# Patient Record
(demographics unavailable — no encounter records)

---

## 2019-01-31 NOTE — ED PDOC
Arrival/HPI





- General


Chief Complaint: Dizziness/Lightheaded


Time Seen by Provider: 19 13:00


Historian: Patient





- History of Present Illness


Narrative History of Present Illness (Text): 





19 13:43


36 year old female, with no significant past medical history, presents to the 

emergency department complaining of dizziness for the past 5 days. Patient 

states she intermittently feels like the room is spinning and her eye sight goes

black. She notes secondary loss of appetite and sometimes experiences 

palpitations. LNMP was approximately 20 days ago. Patient denies fevers, chills,

headache, chest pain, shortness of breath, dyspnea on exertion, cough, abdominal

pain, nausea, vomiting, diarrhea, back pain, neck pain, or any other complaint.











Time/Duration: < week


Symptom Onset: Gradual


Symptom Course: Unchanged


Activities at Onset: Light


Context: Home





Past Medical History





- Provider Review


Nursing Documentation Reviewed: Yes





- Past Medical History


Past Medical History: Non-Contributing





- Psychiatric


Hx Substance Use: No





- Past Surgical History


Past Surgical History: No Previous





- Surgical History


Hx  Section: Yes





- Anesthesia


Hx Anesthesia: Yes


Hx Anesthesia Reactions: No


Hx Malignant Hyperthermia: No





- Suicidal Assessment


Feels Threatened In Home Enviroment: No





Family/Social History





- Physician Review


Nursing Documentation Reviewed: Yes


Family/Social History: No Known Family HX


Smoking Status: Never Smoked


Hx Alcohol Use: Yes


Frequency of alcohol use: Socially


Hx Substance Use: No





Allergies/Home Meds


Allergies/Adverse Reactions: 


Allergies





No Known Allergies Allergy (Verified 01/18/15 01:45)


   











Review of Systems





- Physician Review


All systems were reviewed & negative as marked: Yes





- Review of Systems


Constitutional: absent: Fevers


Respiratory: absent: SOB, Cough


Cardiovascular: absent: Chest Pain


Gastrointestinal: Appetite Changes (loss of appetite).  absent: Abdominal Pain, 

Diarrhea, Nausea


Musculoskeletal: absent: Back Pain, Neck Pain


Skin: absent: Rash


Neurological: Dizziness.  absent: Headache





Physical Exam


Vital Signs Reviewed: Yes





Vital Signs











  Temp Pulse Resp BP Pulse Ox


 


 19 12:55  98.2 F  118 H  18  142/86  98











Temperature: Afebrile


Blood Pressure: Normal


Pulse: Tachycardic


Respiratory Rate: Normal


Appearance: Positive for: Well-Appearing, Non-Toxic, Comfortable


Pain Distress: None


Mental Status: Positive for: Alert and Oriented X 3





- Systems Exam


Head: Present: Atraumatic, Normocephalic


Pupils: Present: PERRL


Extroacular Muscles: Present: EOMI


Conjunctiva: Present: Normal


Mouth: Present: Moist Mucous Membranes


Neck: Present: Normal Range of Motion


Respiratory/Chest: Present: Clear to Auscultation, Good Air Exchange.  No: 

Respiratory Distress, Accessory Muscle Use


Cardiovascular: Present: Regular Rate and Rhythm, Normal S1, S2.  No: Murmurs


Abdomen: Present: Other (abdomen soft).  No: Tenderness, Distention, Peritoneal 

Signs


Back: Present: Normal Inspection


Upper Extremity: Present: Normal Inspection.  No: Cyanosis, Edema


Lower Extremity: Present: Normal Inspection.  No: Edema


Neurological: Present: GCS=15, CN II-XII Intact, Speech Normal, Motor Func 

Grossly Intact (motor strength 5/5 in upper and lower extremities ), Other 

(horizontal nystagmus noted)


Skin: Present: Warm, Dry, Normal Color.  No: Rashes


Psychiatric: Present: Alert, Oriented x 3, Normal Insight, Normal Concentration





Medical Decision Making


ED Course and Treatment: 





19 13:42


Impression:


36 year old female who presents to the emergency department complaining of 

dizziness. 





Differential Diagnosis included but are not limited to:  





Plan:


-- EKG


-- Labs


-- Antivert


-- Reglan


-- POC urine pregnancy test


-- Urinalysis 


-- Reassess and disposition





Prior Visits:


Notes and results from previous visits were reviewed. 





Progress Notes:








- Lab Interpretations


I have reviewed the lab results: Yes





- EKG Interpretation


EKG Interpretation (Text): 





19 13:52


EKG reviewed by me, shows: Sinus tachycardia at 101 bpm with no QT prolongation 

and no T wave inversions.








Interpreted by ED Physician: Yes


Type: 12 lead EKG





- Scribe Statement


The provider has reviewed the documentation as recorded by the Felicitas Do





Provider Scribe Attestation:


All medical record entries made by the Scribe were at my direction and personal

ly dictated by me. I have reviewed the chart and agree that the record 

accurately reflects my personal performance of the history, physical exam, 

medical decision making, and the department course for this patient. I have also

personally directed, reviewed, and agree with the discharge instructions and 

disposition.








Disposition/Present on Arrival





- Present on Arrival


Any Indicators Present on Arrival: No


History of DVT/PE: No


History of Uncontrolled Diabetes: No


Urinary Catheter: No


History of Decub. Ulcer: No


History Surgical Site Infection Following: None





- Disposition


Have Diagnosis and Disposition been Completed?: Yes


Diagnosis: 


 Vertigo





Disposition Time: 15:59


Patient Plan: Discharge


Patient Problems: 


                             Current Active Problems











Problem Status Onset


 


Vertigo Acute 











Condition: STABLE


Discharge Instructions (ExitCare):  Vertigo (a Type of Dizziness) (DC), 

Dizziness, Nonvertigo, (DC)


Print Language: Kyrgyz


Additional Instructions: 





All medical record entries made by the Scribe were at my direction and 

personally dictated by me. I have reviewed the chart and agree that the record 

accurately reflects my personal performance of the history, physical exam, 

medical decision making, and the department course for this patient. I have also

personally directed, reviewed, and agree with the discharge instructions and 

disposition.








Please monitor your symptoms for any changes in symptoms


Prescriptions: 


Meclizine [Antivert] 12.5 mg PO PRN PRN #10 tab


 PRN Reason: Dizziness


Metoclopramide HCl [Reglan] 10 mg PO Q6H #12 tablet


Forms:  NOSTROMO ICT (Hebrew), WORK NOTE

## 2019-01-31 NOTE — CARD
--------------- APPROVED REPORT --------------





Date of service: 01/31/2019



EKG Measurement

Heart [id number]

[id number]

[id number]

[id number]

[id number]



<Conclusion>

Sinus tachycardia

Otherwise normal ECG

## 2019-02-03 NOTE — CT
Date of service: 



02/03/2019



PROCEDURE:  CT Chest with contrast (Pulmonary Angiogram)



HISTORY:

Tachycardia 



COMPARISON:

None available.



TECHNIQUE:

Axial computed tomography images were obtained of the chest in the 

pulmonary arterial phase of enhancement. Coronal and sagittal 

reformatted images were created and reviewed.



Intravenous contrast dose: 146 cc Omnipaque 350



Radiation dose:



Total exam DLP = 309.92 mGy-cm.



This CT exam was performed using one or more of the following dose 

reduction techniques: Automated exposure control, adjustment of the 

mA and/or kV according to patient size, and/or use of iterative 

reconstruction technique.



FINDINGS:



PULMONARY ARTERIES:

There are filling defects seen within the distal of lower lobe 

pulmonary artery extending into at least 2 segmental branches and 

probably 1 or 2 subsegmental branches of the lower lobe of pulmonary 

arteries.  Questionable filling defect within a distal right upper 

lobe segmental pulmonary artery branch.  Pulmonary trunk measures 

approximately 2.6 cm; rule out underlying minimal pulmonary arterial 

hypertension.. 



AORTA:

No acute findings. No thoracic aortic aneurysm.  Ascending thoracic 

aorta measures approximately 2.5 cm.  The no aortic atherosclerotic 

calcification or mural plaque present.



LUNGS:

Unremarkable. No nodule, mass or pulmonary consolidation. 



PLEURAL SPACES:

Unremarkable. No effusion or pneumothorax. 



HEART:

Heart size is borderline enlarged.  No significant pericardial 

effusion. 



LYMPH NODES:

No significant mediastinal or hilar adenopathy.  



Small hiatal hernia 



Trachea midline and patent with no large central endoluminal lesions. 



BONES, CHEST WALL:

Unremarkable. No fracture or destructive lesion 



OTHER FINDINGS:

Unremarkable. 



IMPRESSION:

There is evidence of pulmonary emboli within the distal left lower 

lobe pulmonary artery,  proximal segmental and possibly additional 

subsegmental left lower lobe branch.



Questionable pulmonary embolus within a distal segmental branch right 

upper lobe



The the 



Note that these findings were discussed with the GRIFFIN Douglass at 

approximately 11:48 a.m. with written down and read back 

verification.

## 2019-02-03 NOTE — CT
Date of service: 



02/03/2019



PROCEDURE:  CT HEAD WITHOUT CONTRAST.



HISTORY:

dizziness



COMPARISON:

Wet the the the



TECHNIQUE:

Axial computed tomography images were obtained through the head/brain 

without intravenous contrast.  



Radiation dose:



Total exam DLP = 685.6 mGy-cm.



This CT exam was performed using one or more of the following dose 

reduction techniques: Automated exposure control, adjustment of the 

mA and/or kV according to patient size, and/or use of iterative 

reconstruction technique.



FINDINGS:



HEMORRHAGE:

No intracranial hemorrhage. 



BRAIN:

No mass effect or edema.  No atrophy or chronic microvascular 

ischemic changes.



VENTRICLES:

Unremarkable. No hydrocephalus. 



CALVARIUM:

Unremarkable.



PARANASAL SINUSES:

Unremarkable as visualized. No significant inflammatory changes.



MASTOID AIR CELLS:

Unremarkable as visualized. No inflammatory changes.



OTHER FINDINGS:

None.



IMPRESSION:

No acute intracranial hemorrhage.

## 2019-02-03 NOTE — US
HISTORY:

Leg pain and swelling. Evaluate for DVT



PHYSICIAN(S):  John Hess MD.



TECHNIQUE:

Duplex sonography and color-flow Doppler with graded compression were 

used to evaluate the deep venous systems of both lower extremities. 



FINDINGS:

The visualized deep venous systems of both lower extremities are 

sonographically normal and compressible. Normal wave forms and 

augmentation are seen. There is no sonographic evidence for deep 

venous thrombosis in the visualized segments of both lower 

extremities.



IMPRESSION:

No sonographic evidence for deep venous thrombosis in the visualized 

segments of both lower extremities.

## 2019-02-03 NOTE — CP.PCM.HP
<MemoFlorentin Romeo - Last Filed: 19 15:01>





History of Present Illness





- History of Present Illness


History of Present Illness: 





Florentin Hampton PGY1, History and Physical for Dr Shameka Cao





Pt is a 38yo female with no significant PMH presents to the emergency department

complaining of generalized weakness,  and hunger which started last Saturday. 

She states she has not eaten all day, last time she ate was last night. Pt 

states she presented to the [facility] ED on 19 for similiar symptoms. Pt states 

she has been taking OCP for the past 5 years. She denies a personal or family 

history of coagulation problems. Pt reports some mild associated SOB with the 

weakness. She denies chest pain, nausea, vomiting, headache. A 12 point ROS was 

obtained and added to the HPI where appropriate. 





PMH: denies


PSH:  x2


FH: mother no medical problems, father no medical problems 


SH: denies tobacco, occasional alcohol, denies illicit drugs


Home meds: OCP


Allergies: denies 


PMD: Dr Dwyer








Present on Admission





- Present on Admission


Any Indicators Present on Admission: No





Past Patient History





- Past Social History


Smoking Status: Never Smoked





- PSYCHIATRIC


Hx Substance Use: No





- SURGICAL HISTORY


Hx  Section: Yes





- ANESTHESIA


Hx Anesthesia: Yes


Hx Anesthesia Reactions: No


Hx Malignant Hyperthermia: No





Meds


Allergies/Adverse Reactions: 


                                    Allergies











Allergy/AdvReac Type Severity Reaction Status Date / Time


 


No Known Allergies Allergy   Verified 01/18/15 01:45














Physical Exam





- Head Exam


Head Exam: ATRAUMATIC, NORMOCEPHALIC





- Eye Exam


Eye Exam: EOMI





- ENT Exam


ENT Exam: Mucous Membranes Moist





- Neck Exam


Neck exam: Positive for: Full Rom





- Respiratory Exam


Respiratory Exam: Clear to Auscultation Bilateral, NORMAL BREATHING PATTERN.  

absent: Accessory Muscle Use, Respiratory Distress





- Cardiovascular Exam


Cardiovascular Exam: RRR, +S1, +S2.  absent: Diastolic murmur, Systolic Murmur





- GI/Abdominal Exam


GI & Abdominal Exam: Normal Bowel Sounds, Soft





- Extremities Exam


Extremities exam: Positive for: full ROM, normal inspection, pedal pulses 

present.  Negative for: calf tenderness, pedal edema, tenderness





- Neurological Exam


Neurological exam: Alert, Oriented x3





- Psychiatric Exam


Psychiatric exam: Normal Affect, Normal Mood





- Skin


Skin Exam: Dry, Intact, Warm





Results





- Vital Signs


Recent Vital Signs: 





                                Last Vital Signs











Temp  98.4 F   19 09:44


 


Pulse  93 H  19 11:59


 


Resp  18   19 11:59


 


BP  121/72   19 11:59


 


Pulse Ox  99   19 11:59














- Labs


Result Diagrams: 


                                 19 09:55





                                 19 09:55


Labs: 





                         Laboratory Results - last 24 hr











  19





  09:55 09:55 09:55


 


WBC   4.9  D 


 


RBC   4.38 


 


Hgb   12.8 


 


Hct   40.0 


 


MCV   91.3 


 


MCH   29.2 


 


MCHC   32.0 


 


RDW   13.6 


 


Plt Count   289 


 


MPV   10.0 


 


Neut % (Auto)   65.9 


 


Lymph % (Auto)   23.2 


 


Mono % (Auto)   9.3 H 


 


Eos % (Auto)   1.2 L 


 


Baso % (Auto)   0.4 


 


Lymph # (Auto)   1.1 L 


 


Mono # (Auto)   0.5 


 


Eos # (Auto)   0.1 


 


Baso # (Auto)   0.02 


 


Absolute Neuts (auto)   3.24 


 


PT   


 


INR   


 


APTT   


 


D-Dimer, Quantitative  1336 H  


 


Sodium    139


 


Potassium    4.1


 


Chloride    106


 


Carbon Dioxide    25


 


Anion Gap    13


 


BUN    8


 


Creatinine    0.6 L


 


Est GFR ( Amer)    > 60


 


Est GFR (Non-Af Amer)    > 60


 


Random Glucose    102


 


Calcium    9.4


 


Magnesium    2.1


 


Total Bilirubin    0.3


 


AST    26


 


ALT    24


 


Alkaline Phosphatase    59


 


Total Creatine Kinase    26 L


 


Troponin I    < 0.01


 


NT-Pro-B Natriuret Pep    33.7


 


Total Protein    8.0


 


Albumin    4.4


 


Globulin    3.5


 


Albumin/Globulin Ratio    1.3


 


Free T4   


 


TSH 3rd Generation   


 


Urine Color   


 


Urine Appearance   


 


Urine pH   


 


Ur Specific Gravity   


 


Urine Protein   


 


Urine Glucose (UA)   


 


Urine Ketones   


 


Urine Blood   


 


Urine Nitrate   


 


Urine Bilirubin   


 


Urine Urobilinogen   


 


Ur Leukocyte Esterase   














  19





  09:55 10:30 11:55


 


WBC   


 


RBC   


 


Hgb   


 


Hct   


 


MCV   


 


MCH   


 


MCHC   


 


RDW   


 


Plt Count   


 


MPV   


 


Neut % (Auto)   


 


Lymph % (Auto)   


 


Mono % (Auto)   


 


Eos % (Auto)   


 


Baso % (Auto)   


 


Lymph # (Auto)   


 


Mono # (Auto)   


 


Eos # (Auto)   


 


Baso # (Auto)   


 


Absolute Neuts (auto)   


 


PT    12.0


 


INR    1.06


 


APTT    31.2


 


D-Dimer, Quantitative   


 


Sodium   


 


Potassium   


 


Chloride   


 


Carbon Dioxide   


 


Anion Gap   


 


BUN   


 


Creatinine   


 


Est GFR ( Amer)   


 


Est GFR (Non-Af Amer)   


 


Random Glucose   


 


Calcium   


 


Magnesium   


 


Total Bilirubin   


 


AST   


 


ALT   


 


Alkaline Phosphatase   


 


Total Creatine Kinase   


 


Troponin I   


 


NT-Pro-B Natriuret Pep   


 


Total Protein   


 


Albumin   


 


Globulin   


 


Albumin/Globulin Ratio   


 


Free T4  0.97  


 


TSH 3rd Generation  1.24  


 


Urine Color   Yellow 


 


Urine Appearance   Clear 


 


Urine pH   6.5 


 


Ur Specific Gravity   <= 1.005 


 


Urine Protein   Negative 


 


Urine Glucose (UA)   Negative 


 


Urine Ketones   Negative 


 


Urine Blood   Negative 


 


Urine Nitrate   Negative 


 


Urine Bilirubin   Negative 


 


Urine Urobilinogen   0.2 


 


Ur Leukocyte Esterase   Negative 














Assessment & Plan





- Assessment and Plan (Free Text)


Assessment: 





Pt is a 38yo female with no significant PMH presents to the emergency department

complaining of generalized weakness,  and hunger which started last Saturday. 

She states she has not eaten all day, last time she ate was last night. Pt 

states she presented to the [facility] ED on 19 for similiar symptoms. 


Plan: 





Provoked Pulmonary Embolism


- therapeutic heparin drip


- coag studies, follow up


- CXR shows no active disease


- Head CT: no acute intracranial abnormality 


- Chest CT: evidence of pulmonary emboli within the distal left lower lobe 

pulmonary artery, proximal segment and possibly additional subsegmental left 

lower lobe branch.


- LE duplex up


- ECHO follow up


- pt counseled to stop taking OCP, as this may have increased her risk of having

a pulmonary embolism 


- pt advised to follow up with a hematologist as an out pt





Ppx, diet


- regular diet








Pt seen, examined, assessment and plan discussed with Dr Shameka aHmpton PGY1





- Date & Time


Date: 19


Time: 13:52





<Shameka Cao R - Last Filed: 19 16:02>





Results





- Vital Signs


Recent Vital Signs: 





                                Last Vital Signs











Temp  98.4 F   19 09:44


 


Pulse  90   19 15:02


 


Resp  18   19 15:02


 


BP  118/69   19 15:02


 


Pulse Ox  99   19 15:02














- Labs


Result Diagrams: 


                                 19 09:55





                                 19 09:55


Labs: 





                         Laboratory Results - last 24 hr











  02/03/19 02/03/19 02/03/19





  09:55 09:55 09:55


 


WBC   4.9  D 


 


RBC   4.38 


 


Hgb   12.8 


 


Hct   40.0 


 


MCV   91.3 


 


MCH   29.2 


 


MCHC   32.0 


 


RDW   13.6 


 


Plt Count   289 


 


MPV   10.0 


 


Neut % (Auto)   65.9 


 


Lymph % (Auto)   23.2 


 


Mono % (Auto)   9.3 H 


 


Eos % (Auto)   1.2 L 


 


Baso % (Auto)   0.4 


 


Lymph # (Auto)   1.1 L 


 


Mono # (Auto)   0.5 


 


Eos # (Auto)   0.1 


 


Baso # (Auto)   0.02 


 


Absolute Neuts (auto)   3.24 


 


PT   


 


INR   


 


APTT   


 


D-Dimer, Quantitative  1336 H  


 


Sodium    139


 


Potassium    4.1


 


Chloride    106


 


Carbon Dioxide    25


 


Anion Gap    13


 


BUN    8


 


Creatinine    0.6 L


 


Est GFR ( Amer)    > 60


 


Est GFR (Non-Af Amer)    > 60


 


Random Glucose    102


 


Calcium    9.4


 


Magnesium    2.1


 


Total Bilirubin    0.3


 


AST    26


 


ALT    24


 


Alkaline Phosphatase    59


 


Total Creatine Kinase    26 L


 


Troponin I    < 0.01


 


NT-Pro-B Natriuret Pep    33.7


 


Total Protein    8.0


 


Albumin    4.4


 


Globulin    3.5


 


Albumin/Globulin Ratio    1.3


 


Free T4   


 


TSH 3rd Generation   


 


Urine Color   


 


Urine Appearance   


 


Urine pH   


 


Ur Specific Gravity   


 


Urine Protein   


 


Urine Glucose (UA)   


 


Urine Ketones   


 


Urine Blood   


 


Urine Nitrate   


 


Urine Bilirubin   


 


Urine Urobilinogen   


 


Ur Leukocyte Esterase   














  19





  09:55 10:30 11:55


 


WBC   


 


RBC   


 


Hgb   


 


Hct   


 


MCV   


 


MCH   


 


MCHC   


 


RDW   


 


Plt Count   


 


MPV   


 


Neut % (Auto)   


 


Lymph % (Auto)   


 


Mono % (Auto)   


 


Eos % (Auto)   


 


Baso % (Auto)   


 


Lymph # (Auto)   


 


Mono # (Auto)   


 


Eos # (Auto)   


 


Baso # (Auto)   


 


Absolute Neuts (auto)   


 


PT    12.0


 


INR    1.06


 


APTT    31.2


 


D-Dimer, Quantitative   


 


Sodium   


 


Potassium   


 


Chloride   


 


Carbon Dioxide   


 


Anion Gap   


 


BUN   


 


Creatinine   


 


Est GFR ( Amer)   


 


Est GFR (Non-Af Amer)   


 


Random Glucose   


 


Calcium   


 


Magnesium   


 


Total Bilirubin   


 


AST   


 


ALT   


 


Alkaline Phosphatase   


 


Total Creatine Kinase   


 


Troponin I   


 


NT-Pro-B Natriuret Pep   


 


Total Protein   


 


Albumin   


 


Globulin   


 


Albumin/Globulin Ratio   


 


Free T4  0.97  


 


TSH 3rd Generation  1.24  


 


Urine Color   Yellow 


 


Urine Appearance   Clear 


 


Urine pH   6.5 


 


Ur Specific Gravity   <= 1.005 


 


Urine Protein   Negative 


 


Urine Glucose (UA)   Negative 


 


Urine Ketones   Negative 


 


Urine Blood   Negative 


 


Urine Nitrate   Negative 


 


Urine Bilirubin   Negative 


 


Urine Urobilinogen   0.2 


 


Ur Leukocyte Esterase   Negative 














Attending/Attestation





- Attestation


I have personally seen and examined this patient.: Yes


I have fully participated in the care of the patient.: Yes


I have reviewed all pertinent clinical information: Yes


Notes (Text): 


Patient seen and examined by me with resident at 1:15PM on 2/3/19. Case i

ncluding HPI, physical exam, and assessment and plan discussed with resident. 

Agree with above with following additions/corrections. 


CC: Weakness and dizziness. 


Patient is a 37-year-old female with no significant past medical history that 

presented to the emergency room complaining of generalized weakness and 

dizziness. Patient states that she has been experiencing these symptoms since 

2019. She presented to emergency room on 2019 and states that she 

left without a diagnosis. Patient states that she also has a little shortness of

breath with walking. Patient denies any recent travel. She states that she is a 

 and is pretty active. Patient states that she has been taking birth 

control pills for approximately 5 years now. She denies any chest pain or 

palpitations. No nausea, vomiting, or abdominal pain. No fevers or chills. No 

dysuria. No diarrhea or constipation. No neck or back pain. No headaches or 

lightheadedness. Patient denies any leg or calf pain.


12 point review of systems reviewed by me. Please see above HPI. All other 

systems negative.


Family history: Mother is alive and healthy. Father is alive and healthy.


Physical exam:


General: Awake and alert lying in bed in no acute distress


HEENT: Normocephalic, atraumatic. Extraocular muscles intact, pupils equal and 

reactive, no scleral icterus. Oropharynx is pink and moist. No pharyngeal 

erythema or exudate apreciated. Neck is supple. Hearing grossly intact. Ears and

nose externally unremarkable.


Cardiovascular: Regular rhythm. Normal S1 and S2. No murmurs, rubs, or gallops 

appreciated


Pulmonary: Normal respiratory effort.  No rhonchi, rales, or wheezing 

appreciated.


Gastrointestinal: Soft, nondistended. Nontender. Positive bowel sounds all 4 

quadrants. No guarding. 


Musculoskeletal:  Moves all extremities. No calf tenderness. No edema. 


Central nervous system: AAOx3,  CN 2-12 grossly intact. 5/5 muscle strength all 

extremities.


Dermatologic:  Skin warm and dry.


Assessment and plan: Patient is a 37-year-old female with no significant past 

medical history that presented to the emergency room complaining of generalized 

weakness and dizziness. 


1. Pulmonary emboli. Likely secondary to oral contraceptive pill use. Patient 

advised to stop using her OCPs. CTA chest per radiologist showed evidence of 

pulmonary emboli within the distal left lower lobe pulmonary artery, proximal 

segmental and possibly additional subsegmental left lower lobe branch, 

questionable pulmonary embolism within the distal segmental branch right upper 

lobe. Started on heparin drip. Risks including increased risk of bleeding 

discussed at length with patient. Patient understands the risks of blood 

thinners and agrees to take them. Patient understands that if she plans to be 

pregnant in the future, she will need to speak to her physician prior as she is 

on blood thinners. Bilateral lower extremity venous Dopplers ordered. 2-D echo 

ordered. Monitor on telemetry.


2. Dizziness and weakness. Likely secondary to pulmonary emboli. Head CT per 

radiologist showed no intracranial hemorrhage. Continue to monitor for now.


3. DVT prophylaxis. Heparin


Case discussed in detail with patient and patient's  at bedside with 

. All questions answered.

## 2019-02-03 NOTE — ED PDOC
Arrival/HPI





- General


Chief Complaint: Weakness/Neurological Deficit


Historian: Patient





- History of Present Illness


Narrative History of Present Illness (Text): 





19 09:41


37 year old female, no significant, pmh, nkda, complaining of  feeling fatigue

/dizziness with exertion of activity.  Pt. stated that she was seen here about 5

days ago, diagnosed with vertigo, discharge home with meclizine with limited 

relief, scheduled to see pmd Dr. Fior Dwyer tomorrow.  pt. stated that she 

is here because the symptoms persist and concern plus panic, arrived in the ER 

with tachycardia around 105s resting with negative orthostatic bp reading, no 

recent long distance traveling but she is on the birth control medication, no 

extremity swelling or ecchymosis, no numbness or tingling, no other medical or 

psychological complaints. 





Past Medical History





- Provider Review


Nursing Documentation Reviewed: Yes





- Reproductive


Currently Pregnant: No





- Past Medical History


Past Medical History: Non-Contributing





- Psychiatric


Hx Substance Use: No





- Past Surgical History


Past Surgical History: No Previous





- Surgical History


Hx  Section: Yes





- Anesthesia


Hx Anesthesia: Yes


Hx Anesthesia Reactions: No


Hx Malignant Hyperthermia: No





- Suicidal Assessment


Feels Threatened In Home Enviroment: No





Family/Social History





- Physician Review


Nursing Documentation Reviewed: Yes


Family/Social History: Unknown Family HX


Smoking Status: Never Smoked


Hx Alcohol Use: Yes


Hx Substance Use: No





Allergies/Home Meds


Allergies/Adverse Reactions: 


Allergies





No Known Allergies Allergy (Verified 01/18/15 01:45)


   








Home Medications: 


                                    Home Meds











 Medication  Instructions  Recorded  Confirmed


 


Birth Control Pill 1 tab PO DAILY 19 














Review of Systems





- Review of Systems


Constitutional: Fatigue.  absent: Fevers


Eyes: absent: Vision Changes


ENT: absent: Hearing Changes


Respiratory: absent: SOB, Cough


Cardiovascular: absent: Chest Pain


Gastrointestinal: absent: Abdominal Pain, Nausea, Vomiting


Skin: absent: Rash, Pruritis


Neurological: Dizziness.  absent: Headache, Focal Weakness, Gait Changes, Speech

Changes, Facial Droop, Disequilibrium, Seizure


Endocrine: absent: Diaphoresis


Hemo/Lymphatic: absent: Adenopathy


Psychiatric: absent: Depression, Suicidal Ideation





Physical Exam


Vital Signs Reviewed: Yes





Vital Signs











  Temp Pulse Resp BP Pulse Ox


 


 19 09:00  98.4 F  108 H  18  132/85  100











Temperature: Afebrile


Blood Pressure: Normal


Pulse: Tachycardic


Respiratory Rate: Normal


Appearance: Positive for: Well-Appearing, Non-Toxic, Comfortable


Pain Distress: None


Mental Status: Positive for: Alert and Oriented X 3





- Systems Exam


Head: Present: Atraumatic, Normocephalic.  No: Tenderness, Contusion, Swelling, 

Ecchymosis, Abrasion, Laceration, Other


Pupils: Present: PERRL


Extroacular Muscles: Present: EOMI


Conjunctiva: Present: Normal


Mouth: Present: Moist Mucous Membranes


Pharnyx: Present: Normal.  No: ERYTHEMA, EXUDATE, TONSILS ENLARGED, Uvular 

Deviation, Muffled/Hoarse Voice, Soft Palate/Uvular Edema


Nose (External): Present: Atraumatic.  No: Abrasion, Contusion, Laceration


Nose (Internal): Present: Normal Inspection, No Active Bleeding.  No: 

Rhinorrhea, Septal Hematoma, Epistaxis


Neck: Present: Normal Range of Motion


Respiratory/Chest: Present: Clear to Auscultation, Good Air Exchange.  No: 

Respiratory Distress, Accessory Muscle Use


Cardiovascular: Present: Regular Rate and Rhythm, Normal S1, S2.  No: Murmurs


Abdomen: No: Tenderness, Distention, Peritoneal Signs, Rebound, Guarding


Back: Present: Normal Inspection.  No: CVA Tenderness, Midline Tenderness, 

Paraspinal Tenderness


Upper Extremity: Present: Normal Inspection, Normal ROM, NORMAL PULSES, 

Neurovascularly Intact, Capillary Refill < 2s.  No: Cyanosis, Edema, Tenderness,

Swelling, Erythema, Deformity


Lower Extremity: Present: Normal Inspection, NORMAL PULSES, Normal ROM, 

Neurovascularly Intact, Capillary Refill < 2 s.  No: Edema, CALF TENDERNESS, 

Simón's Sign, Tenderness, Swelling, Deformity, Temperature Abnormalties


Neurological: Present: GCS=15, CN II-XII Intact, Speech Normal, Motor Func 

Grossly Intact, Normal Cerebellar Funct, Gait Normal, Memory Normal, Other 

(normal finger to nose test, normal heel to shin test, bilateral upper and lower

extremities motor 5/5. )


Skin: Present: Warm, Dry, Normal Color.  No: Rashes


Lymphatic: No: Cervical Adenopathy


Psychiatric: Present: Alert, Oriented x 3, Normal Insight, Normal Concentration





Medical Decision Making


ED Course and Treatment: 





19 09:44


-labs


-ekg


-cxr


-IVF


-cardiac monitor





19 11:05


-Dimer 1336, CT ordered





19 12:08


-Urine hcg is negative


-Orthostatic v/s: negative


-CT head show No acute intracranial hemorrhage.


-CTA Chest There is evidence of pulmonary emboli within the distal left lower 

lobe pulmonary artery,  proximal segmental and possibly additional subsegmental 

left lower lobe branch.  Questionable pulmonary embolus within a distal 

segmental branch right upper lobe


-Chest xray ER wet read: no active disease


-EKG: NSR @ 98 BPM, no ST elevation or depression, no T wave inversion


-Labs are non-significant


-BNP within normal limit


-Trop within normal limit. 


-Urinalysis show no UTI


-Pt. refused guaiac exam, stated that she has no rectal bleeding, no black color

stool, no abdominal pain.


-IV PT/PTT and heparin ordered.


-Paging hospitalist for admission. 





19 12:52


-I spoke to Dr. Hatch, discussed about the case/labs/radiology result, agreed to

admit to his service. 





- RAD Interpretation


Radiology Orders: 











19 09:39


CHEST PORTABLE [RAD] Stat 











CT head:





Date of service: 





2019





PROCEDURE:  CT HEAD WITHOUT CONTRAST.





HISTORY:


dizziness





COMPARISON:


Wet the the the





TECHNIQUE:


Axial computed tomography images were obtained through the head/brain without 

intravenous contrast.  





Radiation dose:





Total exam DLP = 685.6 mGy-cm.





This CT exam was performed using one or more of the following dose reduction 

techniques: Automated exposure control, adjustment of the mA and/or kV according

 to patient size, and/or use of iterative reconstruction technique.





FINDINGS:





HEMORRHAGE:


No intracranial hemorrhage. 





BRAIN:


No mass effect or edema.  No atrophy or chronic microvascular ischemic changes.





VENTRICLES:


Unremarkable. No hydrocephalus. 





CALVARIUM:


Unremarkable.





PARANASAL SINUSES:


Unremarkable as visualized. No significant inflammatory changes.





MASTOID AIR CELLS:


Unremarkable as visualized. No inflammatory changes.





OTHER FINDINGS:


None.





IMPRESSION:


No acute intracranial hemorrhage.





 

--------------------------------------------------------------------------------


-------------------


CTA chest: 





FINDINGS:





PULMONARY ARTERIES:


There are filling defects seen within the distal of lower lobe pulmonary artery 

extending into at least 2 segmental branches and probably 1 or 2 subsegmental 

branches of the lower lobe of pulmonary arteries.  Questionable filling defect 

within a distal right upper lobe segmental pulmonary artery branch.  Pulmonary 

trunk measures approximately 2.6 cm; rule out underlying minimal pulmonary 

arterial hypertension.. 





AORTA:


No acute findings. No thoracic aortic aneurysm.  Ascending thoracic aorta 

measures approximately 2.5 cm.  The no aortic atherosclerotic calcification or 

mural plaque present.





LUNGS:


Unremarkable. No nodule, mass or pulmonary consolidation. 





PLEURAL SPACES:


Unremarkable. No effusion or pneumothorax. 





HEART:


Heart size is borderline enlarged.  No significant pericardial effusion. 





LYMPH NODES:


No significant mediastinal or hilar adenopathy.  





Small hiatal hernia 





Trachea midline and patent with no large central endoluminal lesions. 





BONES, CHEST WALL:


Unremarkable. No fracture or destructive lesion 





OTHER FINDINGS:


Unremarkable. 





IMPRESSION:


There is evidence of pulmonary emboli within the distal left lower lobe 

pulmonary artery,  proximal segmental and possibly additional subsegmental left 

lower lobe branch.





Questionable pulmonary embolus within a distal segmental branch right upper lobe





The the 





Note that these findings were discussed with the GRIFFIN Douglass at approximately 11:48 

a.m. with written down and read back verification.


 

--------------------------------------------------------------------------------


----------------------


Chest xray: 





Date of service: 





2019





HISTORY:


 fatigue, dizziness 





COMPARISON:


No prior. 





FINDINGS:





LUNGS:


No active pulmonary disease.





PLEURA:


No significant pleural effusion identified, no pneumothorax apparent.





CARDIOVASCULAR:


No aortic atherosclerotic calcification present.





Normal cardiac size. No pulmonary vascular congestion. 





OSSEOUS STRUCTURES:


No significant abnormalities.





VISUALIZED UPPER ABDOMEN:


Normal.





OTHER FINDINGS:


None.





IMPRESSION:


No active disease.





: Radiologist





- EKG Interpretation


Interpreted by ED Physician: Yes


Type: 12 lead EKG





- Medication Orders


Current Medication Orders: 











Sodium Chloride (Sodium Chloride 0.9%)  1,000 mls @ 999 mls/hr IV .Q1H1M STA


   Stop: 19 10:39











- PA / NP / Resident Statement


MD/DO has reviewed & agrees with the documentation as recorded.





Disposition/Present on Arrival





- Present on Arrival


Any Indicators Present on Arrival: No


History of DVT/PE: No


History of Uncontrolled Diabetes: No


Urinary Catheter: No


History of Decub. Ulcer: No


History Surgical Site Infection Following: None





- Disposition


Have Diagnosis and Disposition been Completed?: Yes


Diagnosis: 


 Pulmonary embolism





Disposition: HOSPITALIZED


Disposition Time: 12:10


Patient Plan: Admission, Telemetry


Patient Problems: 


                             Current Active Problems











Problem Status Onset


 


Pulmonary embolism Acute 











Condition: GUARDED

## 2019-02-03 NOTE — CARD
--------------- APPROVED REPORT --------------





Date of service: 02/03/2019



EKG Measurement

Heart [id number]

[place] [id number]

[id number]

[id number]

[id number]



<Conclusion>

Normal sinus rhythm

Normal ECG

## 2019-02-03 NOTE — RAD
Date of service: 



02/03/2019



HISTORY:

 fatigue, dizziness 



COMPARISON:

No prior. 



FINDINGS:



LUNGS:

No active pulmonary disease.



PLEURA:

No significant pleural effusion identified, no pneumothorax apparent.



CARDIOVASCULAR:

No aortic atherosclerotic calcification present.



Normal cardiac size. No pulmonary vascular congestion. 



OSSEOUS STRUCTURES:

No significant abnormalities.



VISUALIZED UPPER ABDOMEN:

Normal.



OTHER FINDINGS:

None.



IMPRESSION:

No active disease.

## 2019-02-04 NOTE — CARD
--------------- APPROVED REPORT --------------





Date of service: 02/04/2019



EXAM: Two-dimensional and M-mode echocardiogram with Doppler and 

color Doppler.



INDICATION

Pulmonary Embolism 



2D DIMENSIONS 

Left Atrium (2D)3.2   (1.6-4.0cm)IVSd0.9   (0.7-1.1cm)

LVDd4.3   (3.9-5.9cm)PWd0.6   (0.7-1.1cm)

LVDs3.0   (2.5-4.0cm)FS (%) 31.8   %

LVEF (%)60.1   (>50%)



M-Mode DIMENSIONS 

Aortic Root2.40   (2.2-3.7cm)Aortic Cusp Exc.1.50   (1.5-2.0cm)



Aortic Valve

AoV Peak Wrauujtq794.0cm/Rahel Peak GR.7mmHg



Mitral Valve

MV E Mzlqcevl03.0cm/sMV A Yfgpwsbq78.7cm/sE/A ratio0.9



TDI

E/Lateral E'0.0E/Medial E'0.0



Tricuspid Valve

TR Peak Fcgmuokv219bj/sRAP UMCKSVUT74xhFbKG Peak Gr.21mmHg

[id number]



 LEFT VENTRICLE 

The left ventricle is normal size. There is normal left ventricular 

wall thickness. The left ventricular function is normal. The left 

ventricular ejection fraction is within the normal range. There is 

normal LV segmental wall motion. Transmitral Doppler flow pattern is 

Grade I-abnormal relaxation pattern.



 RIGHT VENTRICLE 

The right ventricle is normal size. There is normal right ventricular 

wall thickness. The right ventricular systolic function is normal.



 ATRIA 

The left atrium size is normal. The right atrium size is normal. 

There is a thrombus suspected in the right atrium.



 AORTIC VALVE 

The aortic valve is normal in structure. No aortic regurgitation is 

present. There is no aortic valvular stenosis. 



 MITRAL VALVE 

The mitral valve is normal in structure. There is no mitral valve 

regurgitation noted. There is no mitral valve stenosis.



 TRICUSPID VALVE 

There is mild tricuspid regurgitation.



 PULMONIC VALVE 

The pulmonary valve is normal in structure. There is no pulmonic 

valvular regurgitation. 



 GREAT VESSELS 

The aortic root is normal in size. The IVC is normal in size and 

collapses >50% with inspiration.



 PERICARDIAL EFFUSION 

There is no pericardial effusion.



<Conclusion>

There is normal left ventricular wall thickness.

The left ventricular function is normal.

The left ventricular ejection fraction is within the normal range.

There is normal LV segmental wall motion.

Transmitral Doppler flow pattern is Grade I-abnormal relaxation 

pattern.

The right ventricle is normal size.

The right ventricular systolic function is normal.

There is a thrombus suspected in the right atrium.

## 2019-02-04 NOTE — CP.PCM.PN
<MemoFlorentin - Last Filed: 02/04/19 17:29>





Subjective





- Date & Time of Evaluation


Date of Evaluation: 02/04/19


Time of Evaluation: 06:25





- Subjective


Subjective: 





Pt seen, examined this morning at bedside, pt denies chest pain, SOB. Per 

Nursing, PTT was 177.9, heparin was held for 1 hour, .7 heparin was held 

for 1 hour





Objective





- Vital Signs/Intake and Output


Vital Signs (last 24 hours): 


                                        











Temp Pulse Resp BP Pulse Ox


 


 97.9 F   76   18   102/69   99 


 


 02/04/19 08:00  02/04/19 10:00  02/04/19 08:00  02/04/19 08:00  02/04/19 08:00








Intake and Output: 


                                        











 02/04/19 02/04/19





 06:59 18:59


 


Intake Total  847


 


Balance  847














- Medications


Medications: 


                               Current Medications





Heparin Sodium/Sodium Chloride (Heparin 26176 Units/250ml 1/2 Normal Saline)  

25,000 units in 250 mls @ 11.202 mls/hr IV .[id number] PRN; Protocol


   PRN Reason: ADJUST RATE PER PROTOCOL


   Last Titration: 02/04/19 12:59 Dose:  12 units/kg/hr, 7.468 mls/hr











- Labs


Labs: 


                                        





                                 02/04/19 06:30 





                                 02/04/19 06:30 





                                        











PT  13.0 SECONDS (9.4-12.5)  H  02/03/19  19:21    


 


INR  1.17   02/03/19  19:21    


 


APTT  117.7 Seconds (26.9-38.3)  H*  02/04/19  10:40    














- Constitutional


Appears: No Acute Distress





- Head Exam


Head Exam: ATRAUMATIC, NORMOCEPHALIC





- Eye Exam


Eye Exam: EOMI





- ENT Exam


ENT Exam: Mucous Membranes Moist





- Neck Exam


Neck Exam: Full ROM





- Respiratory Exam


Respiratory Exam: Clear to Ausculation Bilateral, NORMAL BREATHING PATTERN.  

absent: Accessory Muscle Use





- Cardiovascular Exam


Cardiovascular Exam: Tachycardia, REGULAR RHYTHM, +S1, +S2.  absent: Diastolic 

murmur, Murmur





- GI/Abdominal Exam


GI & Abdominal Exam: Soft, Normal Bowel Sounds.  absent: Tenderness





- Extremities Exam


Extremities Exam: Full ROM.  absent: Calf Tenderness, Pedal Edema





- Neurological Exam


Neurological Exam: Alert, Awake, Oriented x3





- Psychiatric Exam


Psychiatric exam: Normal Affect, Normal Mood





- Skin


Skin Exam: Dry, Intact, Warm





Assessment and Plan





- Assessment and Plan (Free Text)


Assessment: 





Pt is a 38yo female with no significant PMH presents to the emergency department

complaining of generalized weakness,  and hunger which started last Saturday. 

She states she has not eaten all day, last time she ate was last night. Pt 

states she presented to the [facility] ED on 1/31/19 for similar symptoms. 


Plan: 





Provoked Pulmonary Embolism


- continue therapeutic heparin drip


- PT 13, INR 1.17, .7, D Dimer 1336


- will transition pt from heparin to eliquis 


- CXR shows no active disease


- Head CT: no acute intracranial abnormality 


- Chest CT: evidence of pulmonary emboli within the distal left lower lobe pu

lmonary artery, proximal segment and possibly additional subsegmental left lower

lobe branch.


- LE duplex no sonographic evidence of DVT


- ECHO completed, waiting for read


- pt counseled to stop taking OCP, as this may have increased her risk of having

a pulmonary embolism


- pt advised to follow up with a hematologist as an out pt


- Heme consulted





Ppx, diet


- regular diet








Pt seen, examined, assessment and plan discussed with Dr Reyna Hampton PGY1, Internal Medicine Resident 





<Katherine rOellana - Last Filed: 02/05/19 15:51>





Objective





- Vital Signs/Intake and Output


Vital Signs (last 24 hours): 


                                        











Temp Pulse Resp BP Pulse Ox


 


 97.1 F L  82   21   115/73   98 


 


 02/05/19 12:00  02/05/19 12:00  02/05/19 12:00  02/05/19 12:00  02/05/19 00:01








Intake and Output: 


                                        











 02/05/19 02/05/19





 06:59 18:59


 


Intake Total  480


 


Output Total  2


 


Balance  478














- Medications


Medications: 


                               Current Medications





Apixaban (Eliquis)  10 mg PO BID YUDITH; Protocol


   Last Admin: 02/05/19 09:32 Dose:  Not Given











- Labs


Labs: 


                                        





                                 02/05/19 07:00 





                                 02/05/19 07:00 





                                        











PT  13.0 SECONDS (9.4-12.5)  H  02/03/19  19:21    


 


INR  1.17   02/03/19  19:21    


 


APTT  78.6 Seconds (26.9-38.3)  H  02/05/19  07:00    














Attending/Attestation





- Attestation


I have personally seen and examined this patient.: Yes


I have fully participated in the care of the patient.: Yes


I have reviewed all pertinent clinical information, including history, physical 

exam and plan: Yes


Notes (Text): 





02/05/19 15:48





Attending note;





Patient seen and examined with resident.


Patient is alert and awake.


Complaining of mild dizziness.


Denies any chest pain.


Complaining of shortness of breath on exertion.


Tolerating diet.





Patient is a 37-year-old female with no significant past medical history that 

presented to the emergency room complaining of generalized weakness and 

dizziness. 





1. Pulmonary emboli. Likely secondary to oral contraceptive pill use. Patient 

advised to stop using her OCPs. 


CTA chest  showed evidence of pulmonary emboli within the distal left lower lobe

pulmonary artery, proximal segmental and possibly additional subsegmental left 

lower lobe branch, questionable pulmonary embolism within the distal segmental 

branch right upper lobe. 


currently on heparin drip. Risks including increased risk of bleeding discussed 

at length with patient. Patient understands the risks of blood thinners and 

agrees to take them. Patient understands that if she plans to be pregnant in the

future, she will need to speak to her physician prior as she is on blood 

thinners. Bilateral lower extremity venous Doppler is negative.


Echocardiogram pending.


2. Dizziness and weakness. Likely secondary to pulmonary emboli. Head CT  showed

no intracranial hemorrhage. Continue oxygen.


3. Needs outpatient hypercoagulable workup. Oncology evaluation requested.





Case discussed in detail with patient and patient's  at bedside with 

.


Upon discharge the patient will follow up with PMD Dr. Dwyer.

## 2019-02-05 NOTE — CP.PCM.DIS
<Florentin Hampton Romeo - Last Filed: 19 14:40>





Provider





- Provider


Date of Admission: 


19 12:54





Attending physician: 


Katherine Orellana MD





Consults: 








19 07:56


Physician Consult Routine 


   Comment: 


   Consulting Provider: Helen Villalba


   Consulting Physician: Helen Villalba


   Reason for Consult: provoked PE





19 07:08


Consult [Physician Consult] Routine 


   Comment: 


   Consulting Provider: Peter Rodriguez


   Consulting Physician: Peter Rodriguez


   Reason for Consult: r atrial thrombus











Time Spent in preparation of Discharge (in minutes): 40





Diagnosis





- Discharge Diagnosis


(1) Pulmonary embolism


Status: Acute   Priority: High   





Hospital Course





- Lab Results


Lab Results: 


                             Most Recent Lab Values











WBC  4.3 10^3/uL (4.5-11.0)  L D 19  07:00    


 


RBC  4.49 10^6/uL (3.5-6.1)   19  07:00    


 


Hgb  12.9 g/dL (12.0-16.0)   19  07:00    


 


Hct  40.7 % (36.0-48.0)   19  07:00    


 


MCV  90.6 fl (80.0-105.0)   19  07:00    


 


MCH  28.7 pg (25.0-35.0)   19  07:00    


 


MCHC  31.7 g/dl (31.0-37.0)   19  07:00    


 


RDW  13.8 % (11.5-14.5)   19  07:00    


 


Plt Count  298 10^3/uL (120.0-450.0)   19  07:00    


 


MPV  9.9 fl (7.0-11.0)   19  07:00    


 


Neut % (Auto)  40.9 % (50.0-68.0)  L  19  07:00    


 


Lymph % (Auto)  38.4 % (22.0-35.0)  H  19  07:00    


 


Mono % (Auto)  17.9 % (1.0-6.0)  H  19  07:00    


 


Eos % (Auto)  2.1 % (1.5-5.0)   19  07:00    


 


Baso % (Auto)  0.7 % (0.0-3.0)   19  07:00    


 


Lymph # (Auto)  1.7  (1.2-3.4)   19  07:00    


 


Mono # (Auto)  0.8  (0.1-0.6)  H  19  07:00    


 


Eos # (Auto)  0.1  (0.0-0.7)   19  07:00    


 


Baso # (Auto)  0.03 K/mm3 (0.0-2.0)   19  07:00    


 


Absolute Neuts (auto)  1.76  (1.4-6.5)   19  07:00    


 


PT  13.0 SECONDS (9.4-12.5)  H  19  19:21    


 


INR  1.17   19  19:21    


 


APTT  78.6 Seconds (26.9-38.3)  H  19  07:00    


 


D-Dimer, Quantitative  1336 ng/mlDDU (0-243)  H  19  09:55    


 


Sodium  140 mmol/L (132-148)   19  07:00    


 


Potassium  4.1 mmol/L (3.6-5.0)   19  07:00    


 


Chloride  105 mmol/L ()   19  07:00    


 


Carbon Dioxide  26 mmol/L (21-33)   19  07:00    


 


Anion Gap  14  (10-20)   19  07:00    


 


BUN  10 mg/dL (7-21)   19  07:00    


 


Creatinine  0.7 mg/dl (0.7-1.2)   19  07:00    


 


Est GFR ( Amer)  > 60   19  07:00    


 


Est GFR (Non-Af Amer)  > 60   19  07:00    


 


Random Glucose  100 mg/dL ()   19  07:00    


 


Calcium  9.5 mg/dL (8.4-10.5)   19  07:00    


 


Magnesium  2.1 mg/dL (1.7-2.2)   19  09:55    


 


Total Bilirubin  0.3 mg/dL (0.2-1.3)   19  07:00    


 


AST  34 U/L (14-36)   19  07:00    


 


ALT  22 U/L (7-56)   19  07:00    


 


Alkaline Phosphatase  60 U/L ()   19  07:00    


 


Lactate Dehydrogenase  334 U/L (333-699)   19  09:00    


 


Total Creatine Kinase  26 U/L ()  L  19  09:55    


 


Troponin I  < 0.01 ng/mL  19  09:55    


 


NT-Pro-B Natriuret Pep  33.7 pg/mL (0-450)   19  09:55    


 


Total Protein  7.8 g/dL (5.8-8.3)   19  07:00    


 


Albumin  4.3 g/dL (3.0-4.8)   19  07:00    


 


Globulin  3.5 gm/dL  19  07:00    


 


Albumin/Globulin Ratio  1.2  (1.1-1.8)   19  07:00    


 


Carcinoembryonic Ag  0.5 ng/mL (0.0-3.0)   19  09:00    


 


Free T4  0.97 ng/dL (0.78-2.19)   19  09:55    


 


TSH 3rd Generation  1.24 mIU/mL (0.46-4.68)   19  09:55    


 


Beta HCG, Quant  < 2.39 mIU/mL (0-6.15)   19  09:00    


 


Urine Color  Yellow  (YELLOW)   19  10:30    


 


Urine Appearance  Clear  (CLEAR)   19  10:30    


 


Urine pH  6.5  (4.7-8.0)   19  10:30    


 


Ur Specific Gravity  <= 1.005  (1.005-1.035)   19  10:30    


 


Urine Protein  Negative mg/dL (<30 mg/dL)   19  10:30    


 


Urine Glucose (UA)  Negative mg/dL (NEGATIVE)   19  10:30    


 


Urine Ketones  Negative mg/dL (NEGATIVE)   19  10:30    


 


Urine Blood  Negative  (NEGATIVE)   19  10:30    


 


Urine Nitrate  Negative  (NEGATIVE)   19  10:30    


 


Urine Bilirubin  Negative  (NEGATIVE)   19  10:30    


 


Urine Urobilinogen  0.2 E.U./dL (<1 E.U./dL)   19  10:30    


 


Ur Leukocyte Esterase  Negative Juice/uL (NEGATIVE)   19  10:30    














- Hospital Course


Hospital Course: 





Hospitalization


Pt is a 38 yo female with no significant PMH presents to the emergency 

department complaining of generalized weakness,  and hunger which started last 

Saturday. She denies a personal or family history of coagulation problems. Pt 

reports some mild associated SOB with the weakness. She states she has not eaten

all day, last time she ate was last night. Pt states she presented to the [facility] ED

on 19 for similiar symptoms. Pt states she has been taking OCP for the past

5 years. 





Discharge


Seen in the hospital for SOB and weakness. Found to have a PE and treated with 

therapeutic heparin. Follow up with your primary physician (Dr. Dwyer) within 1

week of discharge. Follow up with the blood specialist (Dr. Villalba/Dr. Nuñez) within 1-2 weeks of discharge. Started on a blood thinner, Eliquis.  Take

six more days of 10mg, 2 times per day, and then 5mg twice per day for at least 

3 months.  Given a script for a 1 month supply, please follow up with your 

primary doctor for further refills. Stop taking oral contraceptive pills.  DO 

NOT start taking them again while on the blood thinner.  DO NOT take any more 

contraceptives without first discussing and getting approval from your primary 

doctor. If you fall and hit your head while you are taking a blood thinner, you 

must go to your doctor or to the emergency department to get a scan of your head

to make sure you aren't bleeding in your head.





- Date & Time of H&P


Date of H&P: 19


Time of H&P: 06:00





Discharge Exam





- Head Exam


Head Exam: ATRAUMATIC, NORMOCEPHALIC





- Eye Exam


Eye Exam: EOMI





- ENT Exam


ENT Exam: Mucous Membranes Moist





- Neck Exam


Neck exam: Full Rom





- Respiratory Exam


Respiratory Exam: NORMAL BREATHING PATTERN, UNREMARKABLE.  absent: Accessory 

Muscle Use, Respiratory Distress





- Cardiovascular Exam


Cardiovascular Exam: RRR, +S1, +S2.  absent: Diastolic murmur, Systolic Murmur





- GI/Abdominal Exam


GI & Abdominal Exam: Normal Bowel Sounds, Unremarkable





- Extremities Exam


Extremities exam: full ROM, pedal pulses present





- Neurological Exam


Neurological exam: Alert, Oriented x3





- Psychiatric Exam


Psychiatric exam: Normal Affect, Normal Mood





- Skin


Skin Exam: Dry, Warm





Discharge Plan





- Discharge Medications


Prescriptions: 


Apixaban [Eliquis] 5 mg PO BID 30 Days #60 tab


Apixaban [Eliquis] 10 mg PO BID 6 Days #12 tablet





- Follow Up Plan


Condition: GUARDED


Disposition: HOME/ ROUTINE


Instructions:  Pulmonary Embolism (Blood Clot in the Lungs) (DC), Going Home on 

Blood Thinners 


Additional Instructions: 


You were seen in the hospital for your shortness of breath and weakness.  You 

were found to have a blood clot in your lungs.


Please follow up with your primary physician (Dr. Dwyer) within 1 week of 

discharge.  Please bring all papers given to you from the hospital when you see 

your doctor.


Please follow up with the blood specialist (Dr. Villalba/Dr. Nuñez) within 1-

2 weeks of discharge.


You have been started on a blood thinner, Eliquis.  You need to take six more 

days of 10mg, 2 times per day, and then 5mg twice per day for at least 3 months.

  You have been given a script for a 1 month supply, please follow up with your 

primary doctor for further refills.


Please stop taking oral contraceptive pills.  DO NOT start taking them again 

while on the blood thinner.  DO NOT take any more contraceptives without first 

discussing and getting approval from your primary doctor.


If you fall and hit your head while you are taking a blood thinner, you must go 

to your doctor or to the emergency department to get a scan of your head to make

 sure you aren't bleeding in your head.


If you experience new concerning or worsening symptoms, please report to the [facility] Emergency Department.











Usted fue atendido en el hospital por lacy falta de aliento y debilidad. Se 

encontr que dominick un cogulo de medina en los pulmones.


Por favor, osmin un seguimiento con lacy mdico primario (Dr. Dwyer) dentro de la 

primera semana del estuardo. Cuando pradeep a lacy mdico, traiga todos los documentos que

 le entreg el hospital.


Por favor osmin un seguimiento con el especialista en medina (Dr. Villalba / 

Dr. Nuñez) dentro de 1-2 semanas despus del estuardo.


Has comenzado con un anticoagulante, Eliquis. Debe vikram seis arreola ms de 10 mg,

 2 veces al da y luego 5 mg dos veces al da mic al menos 3 meses. Se le ha

 entregado un juan pablo para un suministro de 1 mes, consulte a lacy mdico de 

cabecera para obtener resurtidos adicionales.


Por favor, deje de vikram pldoras anticonceptivas orales. NO comience a tomarlos

 nuevamente mientras est en el anticoagulante. NO tome ms anticonceptivos sin 

antes discutir y obtener la aprobacin de lacy mdico de charly.


Si se  y se golpea la martha mientras nakita un anticoagulante, debe acudir a 

lacy mdico o al servicio de urgencias para que le realicen un examen de la martha

 para asegurarse de que no est sangrando.


Si experimenta sntomas nuevos o que empeoran, informe al Departamento de 

Emergencias ms shilo.


Referrals: 


Helen Villalba MD [Staff Provider] - 


Fior Dwyer DO [Family Provider] - 





<Katherine Orellana - Last Filed: 19 15:54>





Provider





- Provider


Date of Admission: 


19 12:54





Attending physician: 


Katherine Orellana MD





Consults: 








19 07:56


Physician Consult Routine 


   Comment: 


   Consulting Provider: Helen Villalba


   Consulting Physician: Helen Villalba


   Reason for Consult: provoked PE





19 07:08


Consult [Physician Consult] Routine 


   Comment: 


   Consulting Provider: Peter Rodriguez


   Consulting Physician: Peter Rodriguez


   Reason for Consult: r atrial thrombus














Hospital Course





- Lab Results


Lab Results: 


                             Most Recent Lab Values











WBC  4.3 10^3/uL (4.5-11.0)  L D 19  07:00    


 


RBC  4.49 10^6/uL (3.5-6.1)   19  07:00    


 


Hgb  12.9 g/dL (12.0-16.0)   19  07:00    


 


Hct  40.7 % (36.0-48.0)   19  07:00    


 


MCV  90.6 fl (80.0-105.0)   19  07:00    


 


MCH  28.7 pg (25.0-35.0)   19  07:00    


 


MCHC  31.7 g/dl (31.0-37.0)   19  07:00    


 


RDW  13.8 % (11.5-14.5)   19  07:00    


 


Plt Count  298 10^3/uL (120.0-450.0)   19  07:00    


 


MPV  9.9 fl (7.0-11.0)   19  07:00    


 


Neut % (Auto)  40.9 % (50.0-68.0)  L  19  07:00    


 


Lymph % (Auto)  38.4 % (22.0-35.0)  H  19  07:00    


 


Mono % (Auto)  17.9 % (1.0-6.0)  H  19  07:00    


 


Eos % (Auto)  2.1 % (1.5-5.0)   19  07:00    


 


Baso % (Auto)  0.7 % (0.0-3.0)   19  07:00    


 


Lymph # (Auto)  1.7  (1.2-3.4)   19  07:00    


 


Mono # (Auto)  0.8  (0.1-0.6)  H  19  07:00    


 


Eos # (Auto)  0.1  (0.0-0.7)   19  07:00    


 


Baso # (Auto)  0.03 K/mm3 (0.0-2.0)   19  07:00    


 


Absolute Neuts (auto)  1.76  (1.4-6.5)   19  07:00    


 


PT  13.0 SECONDS (9.4-12.5)  H  19  19:21    


 


INR  1.17   19  19:21    


 


APTT  78.6 Seconds (26.9-38.3)  H  19  07:00    


 


D-Dimer, Quantitative  1336 ng/mlDDU (0-243)  H  19  09:55    


 


Sodium  140 mmol/L (132-148)   19  07:00    


 


Potassium  4.1 mmol/L (3.6-5.0)   19  07:00    


 


Chloride  105 mmol/L ()   19  07:00    


 


Carbon Dioxide  26 mmol/L (21-33)   19  07:00    


 


Anion Gap  14  (10-20)   19  07:00    


 


BUN  10 mg/dL (7-21)   19  07:00    


 


Creatinine  0.7 mg/dl (0.7-1.2)   19  07:00    


 


Est GFR ( Amer)  > 60   19  07:00    


 


Est GFR (Non-Af Amer)  > 60   19  07:00    


 


Random Glucose  100 mg/dL ()   19  07:00    


 


Calcium  9.5 mg/dL (8.4-10.5)   19  07:00    


 


Magnesium  2.1 mg/dL (1.7-2.2)   19  09:55    


 


Total Bilirubin  0.3 mg/dL (0.2-1.3)   19  07:00    


 


AST  34 U/L (14-36)   19  07:00    


 


ALT  22 U/L (7-56)   19  07:00    


 


Alkaline Phosphatase  60 U/L ()   19  07:00    


 


Lactate Dehydrogenase  334 U/L (333-699)   19  09:00    


 


Total Creatine Kinase  26 U/L ()  L  19  09:55    


 


Troponin I  < 0.01 ng/mL  19  09:55    


 


NT-Pro-B Natriuret Pep  33.7 pg/mL (0-450)   19  09:55    


 


Total Protein  7.8 g/dL (5.8-8.3)   19  07:00    


 


Albumin  4.3 g/dL (3.0-4.8)   19  07:00    


 


Globulin  3.5 gm/dL  19  07:00    


 


Albumin/Globulin Ratio  1.2  (1.1-1.8)   19  07:00    


 


Carcinoembryonic Ag  0.5 ng/mL (0.0-3.0)   19  09:00    


 


Free T4  0.97 ng/dL (0.78-2.19)   19  09:55    


 


TSH 3rd Generation  1.24 mIU/mL (0.46-4.68)   19  09:55    


 


Beta HCG, Quant  < 2.39 mIU/mL (0-6.15)   19  09:00    


 


Urine Color  Yellow  (YELLOW)   19  10:30    


 


Urine Appearance  Clear  (CLEAR)   19  10:30    


 


Urine pH  6.5  (4.7-8.0)   19  10:30    


 


Ur Specific Gravity  <= 1.005  (1.005-1.035)   19  10:30    


 


Urine Protein  Negative mg/dL (<30 mg/dL)   19  10:30    


 


Urine Glucose (UA)  Negative mg/dL (NEGATIVE)   19  10:30    


 


Urine Ketones  Negative mg/dL (NEGATIVE)   19  10:30    


 


Urine Blood  Negative  (NEGATIVE)   19  10:30    


 


Urine Nitrate  Negative  (NEGATIVE)   19  10:30    


 


Urine Bilirubin  Negative  (NEGATIVE)   19  10:30    


 


Urine Urobilinogen  0.2 E.U./dL (<1 E.U./dL)   19  10:30    


 


Ur Leukocyte Esterase  Negative Juice/uL (NEGATIVE)   19  10:30    














Attending/Attestation





- Attestation


I have personally seen and examined this patient.: Yes


I have fully participated in the care of the patient.: Yes


I have reviewed all pertinent clinical information, including history, physical 

exam and plan: Yes


Notes (Text): 





19 15:51








Attending note;





Patient seen and examined with resident.


Patient is alert and awake.


 denies any dizziness.


 Patient ambulated with physical therapy without hypoxia.


Denies any chest pain.


Complaining of shortness of breath on exertion.


Tolerating diet.





Patient is a 37-year-old female with no significant past medical history that 

presented to the emergency room complaining of generalized weakness and 

dizziness. 





1. Pulmonary emboli. Likely secondary to oral contraceptive pill use. Patient 

advised to stop using her OCPs. 


CTA chest  showed evidence of pulmonary emboli within the distal left lower lobe

pulmonary artery, proximal segmental and possibly additional subsegmental left 

lower lobe branch, questionable pulmonary embolism within the distal segmental 

branch right upper lobe. 


Treated with IV heparin drip.


Currently started on eliquis. Medication delivered to the bedside.


 Risks including increased risk of bleeding discussed at length with patient. 

Patient understands the risks of blood thinners and agrees to take them. Patient

understands that if she plans to be pregnant in the future, she will need to 

speak to her physician prior as she is on blood thinners. Bilateral lower 

extremity venous Doppler is negative.


Echocardiogram showed ejection fraction of 60% on suspected thrombus in the 

right atrium.


Echocardiogram reviewed with cardiologist  Dr. James in detail. Advised to 

continue anti-coagulation.


2. Dizziness and weakness.  resolved. Head CT  showed no intracranial 

hemorrhage. Continue oxygen.


3. Hematology evaluation appreciated. Hypercoagulable workup sent.








Case discussed with PMD Dr. Dwyer in detail.


The follow-up plan explained in detail. Advised to get blood work results from 

medical records.


Patient will be referred to hematologist by PMD.


Patient needs to continue eliquis.








Case discussed in detail with patient and patient's  at bedside with 

.


Upon discharge the patient will follow up with PMROSLYN Dwyer.








19 15:52

## 2019-02-05 NOTE — CON
DATE:  02/05/2019



HEMATOLOGY CONSULTATION



HISTORY OF PRESENT ILLNESS:  This is a 37-year-old woman who comes in with

a pulmonary embolism.  The patient was put on birth control pills recently.

She has been feeling weak, tired.  She went to the emergency room, was sent

home, but now she comes back in with increasing weakness and they

discovered pulmonary emboli and her CAT scan _____ of legs were negative.



PHYSICAL EXAMINATION:

SKIN:  No petechiae.  No bruises.

HEENT:  Anicteric.  Nodes nonpalpable in the axillary, cervical, and

supraclavicular, or inguinal regions.

LUNGS:  Clear at present.  No vertebral tenderness.

BREAST:  Shows no mass, discharge or dimpling.

ABDOMEN:  Shows no liver, no spleen, no tenderness.

EXTREMITIES:  Shows no edema.  Simón's sign is negative.  Plantars are

downgoing bilaterally.



MEDICATIONS:  She is on heparin.  She is going to be switched to Eliquis.



ASSESSMENT AND PLAN:  I told her that she has to take the Eliquis for nine

months total.  For pulmonary embolism, her general doctor is Dr. Uriarte

and I told her that I am ordering many different blood tests to see if she

has a genetic cause for the blood clots, but in the meantime, she has to be

off the oral contraceptives, try something different as well as be on the

Eliquis.  She will be following up with Dr. Uriarte for this.







__________________________________________

Dann Nuñez MD





DD:  02/05/2019 7:53:48

DT:  02/05/2019 9:32:06

Job # [id number]

## 2019-02-06 NOTE — CON
DATE:  2019



REQUESTING PHYSICIAN:  Katherine Orellana MD



REASON FOR CONSULTATION:  Possible right atrial thrombus.



HISTORY OF PRESENT ILLNESS:  This is a 37-year-old woman who presented to

the emergency room complaining of generalized weakness and poor appetite. 

She had recent lightheadedness.  She was seen in the emergency room several

days ago and given Antivert.  She presented for recurrent symptoms.  Blood

work revealed a D-dimer of 1336.  CT angiogram was performed which showed

evidence of distal left lower lobe pulmonary emboli as well as possible

emboli in the left lower lobe branch as well.  An echocardiogram was

performed.  This revealed no evidence of right heart strain.  Left circular

function was normal.  Thrombus could not be excluded in the right atrium. 

Further evaluation was requested.  She has been on oral contraceptives for

the past 5 years.



PAST MEDICAL HISTORY:  Notable for two  sections.



MEDICATIONS AT HOME:  Oral contraceptives only.



ALLERGIES:  NONE.



SOCIAL HISTORY:  She does not smoke or drink.



FAMILY HISTORY:  Both parents are alive and in good health.  There is no

history of premature heart disease or sudden death.



REVIEW OF SYSTEMS:  A 10-point review of system is otherwise unremarkable. 

There is no history of prior thromboembolic events.



PHYSICAL EXAMINATION:

GENERAL:  She is a fairly healthy appearing middle aged woman.

VITAL SIGNS:  Her blood pressure is 112/70 with a pulse of 86 and sinus,

respirations of 16.  She is afebrile.

HEENT:  Normocephalic and atraumatic.

NECK:  Supple.  No JVD noted.

CHEST:  Clear to auscultation and percussion.

HEART:  PMI in normal position.  No pathological murmurs or gallops noted.

ABDOMEN:  Soft and nontender.  Normoactive bowel sounds.

EXTREMITIES:  No clubbing, cyanosis, edema.

SKIN:  Warm and dry.

PSYCHIATRIC:  Normal mood and affect.

NEUROLOGIC:  Alert and oriented x3 with no gross motor or sensory deficits

noted.



DIAGNOSTIC DATA:  Potassium is 4.1, BUN and creatinine 10 and 0.7.  White

count is 4.3, hemoglobin and hematocrit 12.9 and 40.7 with platelet count

298,000.  PTT is 78.6 on IV heparin.  Chest x-ray reveals normal cardiac

silhouette with clear lung fields.  Electrocardiogram shows sinus rhythm

with no abnormalities noted.  Lower extremity venous Duplex shows no

evidence of DVT.  Echocardiogram was reviewed and there does not appear to

be a clear thrombus in the right atrium.  The visualized segment appears to

be mild thickening of the tricuspid and tricuspid valve annulus.



IMPRESSION:  Apparent pulmonary emboli possibly related to oral

contraceptive use and hypercoagulable workup is in progress.



RECOMMENDATIONS.  I agree with current management at this time.  Avoidance

of oral contraceptive use is advised.  Oral Eliquis therapy has been

initiated will be continued for 6 months given her pulmonary embolus.  We

will be happy to see her in the future as needed.







__________________________________________

Peter Rodriguez MD





DD:  2019 21:13:49

DT:  2019 1:54:13

[facility] # [id number]

MTDD

## 2019-02-19 NOTE — ED PDOC
Arrival/HPI





- General


Chief Complaint: Lower Extremity Problem/Injury


Time Seen by Provider: 19 23:34


Historian: Patient





- History of Present Illness


Narrative History of Present Illness (Text): 





19 01:04


38 yo F presents to the emergency room complaining of her knees feeling weak, 

shaky and anxious tonight while she was trying to sleep.  Patient states that 

she was recently here in this hospital earlier this month and was admitted and 

diagnosed for a PE.  States that she is compliant with her Eliquis and that she 

did follow-up with hematologist and PMD this week advised to continue taking 

Eliquis.  Otherwise she reports no chest pain, shortness of breath, dyspnea, 

fever, nausea, vomiting, leg pain or swelling, numbness, decrease in range of 

motion, headache, dizziness, syncope.





Past Medical History





- Infectious Disease


Hx of Infectious Diseases: None





- Reproductive


Currently Pregnant: No





- Past Medical History


Past Medical History: Non-Contributing





- Cardiac


Hx Cardiac Disorders: No





- Pulmonary


Hx Respiratory Disorders: No


Hx Pulmonary Embolism: Yes





- Neurological


Hx Neurological Disorder: No





- HEENT


Hx HEENT Disorder: No





- Renal


Hx Renal Disorder: No





- Endocrine/Metabolic


Hx Endocrine Disorders: No





- Hematological/Oncological


Hx Blood Disorders: No





- Integumentary


Hx Dermatological Disorder: No





- Musculoskeletal/Rheumatological


Hx Musculoskeletal Disorders: No


Hx Falls: No





- Gastrointestinal


Hx Gastrointestinal Disorders: No





- Genitourinary/Gynecological


Hx Genitourinary Disorders: No





- Psychiatric


Hx Psychophysiologic Disorder: No


Hx Substance Use: No





- Past Surgical History


Past Surgical History: No Previous





- Surgical History


Hx  Section: Yes





- Anesthesia


Hx Anesthesia: Yes


Hx Anesthesia Reactions: No


Hx Malignant Hyperthermia: No





- Suicidal Assessment


Feels Threatened In Home Enviroment: No





Family/Social History


Family/Social History: No Known Family HX


Smoking Status: Never Smoked


Hx Alcohol Use: Yes


Hx Substance Use: No





Allergies/Home Meds


Allergies/Adverse Reactions: 


Allergies





No Known Allergies Allergy (Verified 19 23:29)


   











Review of Systems





- Review of Systems


Constitutional: absent: Fatigue, Fevers


Respiratory: absent: SOB, Cough


Cardiovascular: absent: Chest Pain, Palpitations


Gastrointestinal: absent: Abdominal Pain, Nausea, Vomiting


Musculoskeletal: absent: Arthralgias, Back Pain, Neck Pain, Joint Swelling, 

Myalgias


Skin: absent: Rash, Pruritis, Skin Lesions


Neurological: absent: Headache, Dizziness





Physical Exam





Vital Signs











  Temp Pulse Resp BP Pulse Ox


 


 19 00:34  98.5 F  100 H  18  122/72  100











Temperature: Afebrile


Blood Pressure: Normal


Pulse: Regular


Respiratory Rate: Normal


Appearance: Positive for: Well-Appearing, Non-Toxic, Comfortable, Other (patient

appears anxious)


Pain Distress: None


Mental Status: Positive for: Alert and Oriented X 3





- Systems Exam


Head: Present: Atraumatic, Normocephalic


Pupils: Present: PERRL


Extroacular Muscles: Present: EOMI


Conjunctiva: Present: Normal


Mouth: Present: Moist Mucous Membranes


Neck: Present: Normal Range of Motion


Respiratory/Chest: Present: Clear to Auscultation, Good Air Exchange.  No: 

Respiratory Distress, Accessory Muscle Use


Cardiovascular: Present: Regular Rate and Rhythm, Normal S1, S2.  No: Murmurs


Abdomen: No: Tenderness, Distention, Peritoneal Signs


Back: Present: Normal Inspection


Upper Extremity: Present: Normal Inspection.  No: Cyanosis, Edema


Lower Extremity: Present: Normal Inspection, NORMAL PULSES, Normal ROM, 

Neurovascularly Intact, Capillary Refill < 2 s.  No: Edema, Tenderness, 

Swelling, Erythema, Deformity, Temperature Abnormalties


Neurological: Present: GCS=15, CN II-XII Intact, Speech Normal, Motor Func 

Grossly Intact, Normal Sensory Function


Skin: Present: Warm, Dry, Normal Color.  No: Rashes


Psychiatric: Present: Alert, Oriented x 3, Normal Insight, Normal Concentration





Medical Decision Making


ED Course and Treatment: 





19 01:06


US doppler of b/l LE ordered.


Given xanax po.








US doppler of b/l LE : no DVT as per US tech. 





On reevaluation, patient reports no chest pain, SOB, headache, dizziness. On 

exam, patient remains awake alert and oriented 3 in no acute distress. US 

results d/w the patient.





Advised to follow up with primary care physician in 1-2 days without fail. 

Advised to continue taking eliquis. Return to the emergency room at any time for

any new or worsening symptoms.





Patient states she fully agrees with and understands discharge instructions. 

States that she agrees with the plan and disposition. Verbalized and repeated 

discharge instructions and plan. I have given the patient opportunity to ask any

additional questions.





- RAD Interpretation


Radiology Orders: 











19 23:35


DUPLEX LOWER EXTRM VEIN BILAT [US] Stat 














- Medication Orders


Current Medication Orders: 














Discontinued Medications





Alprazolam (Xanax)  0.25 mg PO STAT STA; Protocol


   Stop: 19 00:48











- PA / NP / Resident Statement


MD/ has reviewed & agrees with the documentation as recorded.





Disposition/Present on Arrival





- Present on Arrival


Any Indicators Present on Arrival: No


History of DVT/PE: No


History of Uncontrolled Diabetes: No


Urinary Catheter: No


History of Decub. Ulcer: No


History Surgical Site Infection Following: None





- Disposition


Have Diagnosis and Disposition been Completed?: Yes


Diagnosis: 


 Anxiety





Disposition: HOME/ ROUTINE


Disposition Time: 01:00


Patient Plan: Discharge


Condition: STABLE


Discharge Instructions (ExitCare):  Anxiety, Adult (DC)


Print Language: Sinhala


Additional Instructions: 


Thank you for letting us take care of you today. You were treated for anxiety. 

The emergency medical care you received today was directed at your acute 

symptoms. Return to the Emergency Department if your symptoms worsen, do not 

improve, or if you have any other problems.





Please contact your doctor in 2 days for re-evaluation and follow up. Bring any 

paperwork you were given at discharge with you along with any medications you 

are taking to your follow up visit. Our treatment cannot replace ongoing medical

 care by a primary care provider (PCP) outside of the emergency department.





Thank you for allowing the [facility] team to be part of your care today.








If you had an US: A Radiologist will review the ED reading if any change in 

treatment is needed we will contact you.***





Referrals: 


Fior Dwyer DO [Primary Care Provider] - Follow up with primary


Forms:  [employer] (English), WORK NOTE